# Patient Record
Sex: FEMALE | Race: WHITE | NOT HISPANIC OR LATINO | URBAN - METROPOLITAN AREA
[De-identification: names, ages, dates, MRNs, and addresses within clinical notes are randomized per-mention and may not be internally consistent; named-entity substitution may affect disease eponyms.]

---

## 2017-01-20 ENCOUNTER — OUTPATIENT (OUTPATIENT)
Dept: OUTPATIENT SERVICES | Facility: HOSPITAL | Age: 67
LOS: 1 days | End: 2017-01-20
Payer: COMMERCIAL

## 2017-01-20 VITALS
TEMPERATURE: 98 F | DIASTOLIC BLOOD PRESSURE: 66 MMHG | HEART RATE: 63 BPM | WEIGHT: 150.36 LBS | OXYGEN SATURATION: 97 % | SYSTOLIC BLOOD PRESSURE: 107 MMHG | HEIGHT: 64.5 IN | RESPIRATION RATE: 13 BRPM

## 2017-01-20 VITALS
HEART RATE: 65 BPM | OXYGEN SATURATION: 100 % | SYSTOLIC BLOOD PRESSURE: 122 MMHG | RESPIRATION RATE: 16 BRPM | DIASTOLIC BLOOD PRESSURE: 80 MMHG

## 2017-01-20 DIAGNOSIS — Z98.42 CATARACT EXTRACTION STATUS, LEFT EYE: Chronic | ICD-10-CM

## 2017-01-20 DIAGNOSIS — H25.11 AGE-RELATED NUCLEAR CATARACT, RIGHT EYE: ICD-10-CM

## 2017-01-20 PROCEDURE — 66984 XCAPSL CTRC RMVL W/O ECP: CPT | Mod: RT

## 2017-01-20 PROCEDURE — V2787: CPT

## 2017-01-20 NOTE — ASU DISCHARGE PLAN (ADULT/PEDIATRIC). - NOTIFY
Bleeding that does not stop/Pain not relieved by Medications/Persistent Nausea and Vomiting/Swelling that continues/Fever greater than 101

## 2017-01-20 NOTE — ASU DISCHARGE PLAN (ADULT/PEDIATRIC). - PT EDUC
Implant card (specify)/Intraocular lens implant (IOL) , Eye shield with instructions , sunglasses and eye kit given to patient.

## 2018-05-04 ENCOUNTER — EMERGENCY (EMERGENCY)
Facility: HOSPITAL | Age: 68
LOS: 1 days | End: 2018-05-04
Attending: EMERGENCY MEDICINE
Payer: MEDICARE

## 2018-05-04 VITALS
RESPIRATION RATE: 17 BRPM | OXYGEN SATURATION: 99 % | HEART RATE: 76 BPM | DIASTOLIC BLOOD PRESSURE: 84 MMHG | WEIGHT: 149.91 LBS | HEIGHT: 64 IN | SYSTOLIC BLOOD PRESSURE: 135 MMHG | TEMPERATURE: 97 F

## 2018-05-04 DIAGNOSIS — Z98.42 CATARACT EXTRACTION STATUS, LEFT EYE: Chronic | ICD-10-CM

## 2018-05-04 PROCEDURE — 70486 CT MAXILLOFACIAL W/O DYE: CPT

## 2018-05-04 PROCEDURE — 99284 EMERGENCY DEPT VISIT MOD MDM: CPT | Mod: 25

## 2018-05-04 PROCEDURE — 70486 CT MAXILLOFACIAL W/O DYE: CPT | Mod: 26

## 2018-05-04 PROCEDURE — 12011 RPR F/E/E/N/L/M 2.5 CM/<: CPT

## 2018-05-04 PROCEDURE — G0168: CPT

## 2018-05-04 RX ORDER — ACETAMINOPHEN 500 MG
975 TABLET ORAL ONCE
Qty: 0 | Refills: 0 | Status: COMPLETED | OUTPATIENT
Start: 2018-05-04 | End: 2018-05-04

## 2018-05-04 RX ADMIN — Medication 975 MILLIGRAM(S): at 22:54

## 2018-05-04 NOTE — ED PROVIDER NOTE - PHYSICAL EXAMINATION
NAD, VSS, Afebrile, + PERRL with full EOMs, + Multiple superficial abrasions to fight sided face, + Right superficial 0.5ml laceration to right lat eyebrow, + Right zygoma and nasal  with mild swelling, + Superficial abrasions to right shoulder and left knee without amaris tender, full ROMs of all extremities. Neuro intact. NAD, VSS, Afebrile, + PERRL with full EOMs, + Multiple superficial abrasions to fight sided face, + Right superficial 0.5ml laceration to right lat eyebrow, + Right zygoma and nasal  with mild swelling, + Superficial abrasions to right shoulder and left knee without amaris tender, full ROMs of all extremities. Neuro intact.       Attending note. Patient is alert and in no acute distress. Pupils are 3 mm equal reactive drape patient has bilateral intraocular lenses. Excellent ocular muscles are intact. Patient's tenderness over the right zygoma. Patient has abrasions about the right brow, right cheek, bridge of her nose. There is no mid face tenderness. TMJ is nontender. Patient has full range of motion of her neck without pain or neurologic symptoms.  Neck and spine are nontender. Chest/RIBS are nontender. Abdomen is soft and nontender. Pelvis is nontender. Patient has minimal tenderness with an abrasion in the palm of the right hand. There is no wrist hand or elbow tenderness.  Patient has full range of motion of the lower extremity without pain or tenderness. Patient has abrasion over her left patella which he says is nontender. Neurologic examination is nonfocal.

## 2018-05-04 NOTE — ED PROVIDER NOTE - CARE PLAN
Principal Discharge DX:	Facial laceration, initial encounter  Secondary Diagnosis:	Facial contusion, initial encounter

## 2018-05-04 NOTE — ED PROVIDER NOTE - MEDICAL DECISION MAKING DETAILS
Attending note-she would fall with abrasions to the face and the left knee. Tenderness to right zygoma-CT to rule out fracture

## 2018-05-04 NOTE — ED PROVIDER NOTE - OBJECTIVE STATEMENT
68yo female pt, ambulatory, no PMHx c/o facial injury and multiple abrasions s/p mechanical fall one hour prior to arrival. Pt stated she tripped on sidewalk and fell on right sided face. Denies LOC or dizziness or visual changes. Denies neck or back pain. Denies sensory changes or weakness to extremities. Denies CP/SOB/ABD pain or N/V. Denies pelvic or hip pain. Denies fever, chills or recent sickness. TD- UTD. 68yo female pt, ambulatory, no PMHx c/o facial injury and multiple abrasions s/p mechanical fall one hour prior to arrival. Pt stated she tripped on sidewalk and fell on right sided face. Denies LOC or dizziness or visual changes. Denies neck or back pain. Denies sensory changes or weakness to extremities. Denies CP/SOB/ABD pain or N/V. Denies pelvic or hip pain. Denies fever, chills or recent sickness. TD- UTD.       Attending note. Patient was seen in fast track room #5. Agree with the above. Patient tripped on uneven pavement today falling and striking her face on the ground. Patient sustained abrasions to the face and left knee. Patient complaining of pain in the right cheek. Patient denies any headache, dizziness, nausea or vomiting. Patient has no neck pain or back pain at this time. There is no chest/rib pain or abdominal pain. He has some pain in the palm of the right hand. Patient has no numbness or weakness. Patient denies any epistaxis.

## 2021-02-09 ENCOUNTER — APPOINTMENT (OUTPATIENT)
Dept: MAMMOGRAPHY | Facility: IMAGING CENTER | Age: 71
End: 2021-02-09
Payer: MEDICARE

## 2021-02-09 ENCOUNTER — OUTPATIENT (OUTPATIENT)
Dept: OUTPATIENT SERVICES | Facility: HOSPITAL | Age: 71
LOS: 1 days | End: 2021-02-09
Payer: MEDICARE

## 2021-02-09 ENCOUNTER — APPOINTMENT (OUTPATIENT)
Dept: RADIOLOGY | Facility: IMAGING CENTER | Age: 71
End: 2021-02-09
Payer: MEDICARE

## 2021-02-09 DIAGNOSIS — Z00.8 ENCOUNTER FOR OTHER GENERAL EXAMINATION: ICD-10-CM

## 2021-02-09 DIAGNOSIS — Z98.42 CATARACT EXTRACTION STATUS, LEFT EYE: Chronic | ICD-10-CM

## 2021-02-09 PROCEDURE — 77067 SCR MAMMO BI INCL CAD: CPT

## 2021-02-09 PROCEDURE — 77080 DXA BONE DENSITY AXIAL: CPT

## 2021-02-09 PROCEDURE — 77063 BREAST TOMOSYNTHESIS BI: CPT | Mod: 26

## 2021-02-09 PROCEDURE — 77080 DXA BONE DENSITY AXIAL: CPT | Mod: 26

## 2021-02-09 PROCEDURE — 77067 SCR MAMMO BI INCL CAD: CPT | Mod: 26

## 2021-02-09 PROCEDURE — 77063 BREAST TOMOSYNTHESIS BI: CPT

## 2021-02-17 ENCOUNTER — APPOINTMENT (OUTPATIENT)
Dept: MAMMOGRAPHY | Facility: IMAGING CENTER | Age: 71
End: 2021-02-17
Payer: MEDICARE

## 2021-02-17 ENCOUNTER — APPOINTMENT (OUTPATIENT)
Dept: ULTRASOUND IMAGING | Facility: IMAGING CENTER | Age: 71
End: 2021-02-17
Payer: MEDICARE

## 2021-02-17 ENCOUNTER — OUTPATIENT (OUTPATIENT)
Dept: OUTPATIENT SERVICES | Facility: HOSPITAL | Age: 71
LOS: 1 days | End: 2021-02-17
Payer: MEDICARE

## 2021-02-17 DIAGNOSIS — Z98.42 CATARACT EXTRACTION STATUS, LEFT EYE: Chronic | ICD-10-CM

## 2021-02-17 DIAGNOSIS — Z00.8 ENCOUNTER FOR OTHER GENERAL EXAMINATION: ICD-10-CM

## 2021-02-17 PROCEDURE — 76642 ULTRASOUND BREAST LIMITED: CPT | Mod: 26,RT

## 2021-02-17 PROCEDURE — 77065 DX MAMMO INCL CAD UNI: CPT

## 2021-02-17 PROCEDURE — 76642 ULTRASOUND BREAST LIMITED: CPT

## 2021-02-17 PROCEDURE — G0279: CPT

## 2021-02-17 PROCEDURE — G0279: CPT | Mod: 26

## 2021-02-17 PROCEDURE — 77065 DX MAMMO INCL CAD UNI: CPT | Mod: 26,RT

## 2021-02-23 ENCOUNTER — RESULT REVIEW (OUTPATIENT)
Age: 71
End: 2021-02-23

## 2021-02-23 ENCOUNTER — APPOINTMENT (OUTPATIENT)
Dept: MAMMOGRAPHY | Facility: IMAGING CENTER | Age: 71
End: 2021-02-23
Payer: MEDICARE

## 2021-02-23 ENCOUNTER — OUTPATIENT (OUTPATIENT)
Dept: OUTPATIENT SERVICES | Facility: HOSPITAL | Age: 71
LOS: 1 days | End: 2021-02-23
Payer: MEDICARE

## 2021-02-23 DIAGNOSIS — Z00.8 ENCOUNTER FOR OTHER GENERAL EXAMINATION: ICD-10-CM

## 2021-02-23 DIAGNOSIS — Z98.42 CATARACT EXTRACTION STATUS, LEFT EYE: Chronic | ICD-10-CM

## 2021-02-23 PROCEDURE — 88305 TISSUE EXAM BY PATHOLOGIST: CPT

## 2021-02-23 PROCEDURE — 77065 DX MAMMO INCL CAD UNI: CPT

## 2021-02-23 PROCEDURE — 88305 TISSUE EXAM BY PATHOLOGIST: CPT | Mod: 26

## 2021-02-23 PROCEDURE — 77065 DX MAMMO INCL CAD UNI: CPT | Mod: 26,RT

## 2021-02-23 PROCEDURE — 19081 BX BREAST 1ST LESION STRTCTC: CPT

## 2021-02-23 PROCEDURE — 19081 BX BREAST 1ST LESION STRTCTC: CPT | Mod: RT

## 2021-02-23 PROCEDURE — A4648: CPT

## 2022-04-04 ENCOUNTER — APPOINTMENT (OUTPATIENT)
Dept: ORTHOPEDIC SURGERY | Facility: CLINIC | Age: 72
End: 2022-04-04

## 2023-09-12 ENCOUNTER — EMERGENCY (EMERGENCY)
Facility: HOSPITAL | Age: 73
LOS: 1 days | Discharge: HOME | End: 2023-09-12
Attending: EMERGENCY MEDICINE
Payer: MEDICARE

## 2023-09-12 VITALS
HEART RATE: 73 BPM | HEIGHT: 65 IN | DIASTOLIC BLOOD PRESSURE: 81 MMHG | WEIGHT: 149.91 LBS | RESPIRATION RATE: 20 BRPM | SYSTOLIC BLOOD PRESSURE: 130 MMHG | OXYGEN SATURATION: 98 % | TEMPERATURE: 98 F

## 2023-09-12 DIAGNOSIS — Z98.42 CATARACT EXTRACTION STATUS, LEFT EYE: Chronic | ICD-10-CM

## 2023-09-12 PROCEDURE — 12002 RPR S/N/AX/GEN/TRNK2.6-7.5CM: CPT

## 2023-09-12 PROCEDURE — 99284 EMERGENCY DEPT VISIT MOD MDM: CPT | Mod: 25,GC

## 2023-09-13 VITALS
TEMPERATURE: 98 F | HEART RATE: 58 BPM | OXYGEN SATURATION: 97 % | RESPIRATION RATE: 19 BRPM | SYSTOLIC BLOOD PRESSURE: 150 MMHG | DIASTOLIC BLOOD PRESSURE: 87 MMHG

## 2023-09-13 PROCEDURE — 90715 TDAP VACCINE 7 YRS/> IM: CPT

## 2023-09-13 PROCEDURE — 90471 IMMUNIZATION ADMIN: CPT

## 2023-09-13 PROCEDURE — 99283 EMERGENCY DEPT VISIT LOW MDM: CPT | Mod: 25

## 2023-09-13 PROCEDURE — 12002 RPR S/N/AX/GEN/TRNK2.6-7.5CM: CPT

## 2023-09-13 RX ORDER — IBUPROFEN 200 MG
600 TABLET ORAL ONCE
Refills: 0 | Status: COMPLETED | OUTPATIENT
Start: 2023-09-13 | End: 2023-09-13

## 2023-09-13 RX ORDER — ACETAMINOPHEN 500 MG
650 TABLET ORAL ONCE
Refills: 0 | Status: COMPLETED | OUTPATIENT
Start: 2023-09-13 | End: 2023-09-13

## 2023-09-13 RX ORDER — TETANUS TOXOID, REDUCED DIPHTHERIA TOXOID AND ACELLULAR PERTUSSIS VACCINE, ADSORBED 5; 2.5; 8; 8; 2.5 [IU]/.5ML; [IU]/.5ML; UG/.5ML; UG/.5ML; UG/.5ML
0.5 SUSPENSION INTRAMUSCULAR ONCE
Refills: 0 | Status: COMPLETED | OUTPATIENT
Start: 2023-09-13 | End: 2023-09-13

## 2023-09-13 RX ADMIN — Medication 600 MILLIGRAM(S): at 03:15

## 2023-09-13 RX ADMIN — Medication 650 MILLIGRAM(S): at 03:15

## 2023-09-13 RX ADMIN — TETANUS TOXOID, REDUCED DIPHTHERIA TOXOID AND ACELLULAR PERTUSSIS VACCINE, ADSORBED 0.5 MILLILITER(S): 5; 2.5; 8; 8; 2.5 SUSPENSION INTRAMUSCULAR at 02:21

## 2023-09-13 NOTE — ED PROVIDER NOTE - ATTENDING CONTRIBUTION TO CARE
MD Verdugo:  patient seen and evaluated personally.   I agree with the History & Physical,  Impression & Plan other than what was detailed in my note.  MD Verdugo  73 y/ of not on blood thinners, r handed was cutting watermelon just prior to arrival when she cuts thenar eminence, mild pain, took no meds, no cp, no abd pain, afebrile vitals stable  non toxic well appearing, NC/AT,  conjunctiva non conjected, sclera anicteric, moist mucous membranes, neck supple, heart sounds, normal, no mrg, lungs cta b/l no wrr, abd soft non distended w/ no tenderness, no visual deformities of extremities, axox3, normal mood and affect, pos lac to thenar eminence nv intat distally, will need sutures, tdap

## 2023-09-13 NOTE — ED PROVIDER NOTE - NSICDXPASTMEDICALHX_GEN_ALL_CORE_FT
PAST MEDICAL HISTORY:  Breast Lump bilateral biopsy 3x biopsy benign    Dislocation of Shoulder

## 2023-09-13 NOTE — ED PROVIDER NOTE - PHYSICAL EXAMINATION
GENERAL: Awake, alert, NAD  HEENT: NC/AT, moist mucous membranes, PERRL, EOMI  LUNGS: CTAB, no wheezes or crackles   CARDIAC: RRR, no m/r/g  ABDOMEN: Soft, non tender, non distended, no rebound, no guarding  BACK: No midline spinal tenderness, no CVA tenderness  EXT: No edema, no calf tenderness, 2+ DP pulses bilaterally, no deformities. laceration of left palm ~5cm  NEURO: A&Ox3. Moving all extremities.  SKIN: Warm and dry. No rash.  PSYCH: Normal affect.

## 2023-09-13 NOTE — ED PROVIDER NOTE - NSFOLLOWUPINSTRUCTIONS_ED_ALL_ED_FT
Sutures, Staples, or Adhesive Wound Closure    Please return in 7-10 days for suture removal.   You may also follow up with the hand surgeon in a week.      Return to the ER for any new or concerning symptoms.   You may take 650 mg acetaminophen every eight hours or 600 mg Motrin every 6 hours as needed for pain.   Drink plenty of fluids and rest.      Doctors use stitches (sutures), staples, skin glue (tissue adhesive), and skin tape (adhesive strips) to hold your skin together while it heals (wound closure). What your doctor will use depends on your wound. Your doctor also may use more than one way to close your wound.    In most cases, your wound will be closed right away (primary skin closure). Sometimes, it may be closed later so that it can be cleaned and then heal on its own (delayed wound closure).    Sutures    Sutures come in many different materials, strengths, and sizes. Some sutures break down as your wound heals (absorbable). Other sutures need to be taken out (nonabsorbable).  To use sutures, your doctor will:  •Sew your skin together with sutures and a needle.  •Use one long stitch or separate stitches.  •Tie and cut the sutures at the end.    Sutures can be used for all types of wounds, including under the skin. They can cause a skin reaction that can lead to infection.      Staples are faster to use than sutures, and they cause less reaction from your skin. Staples need to be taken out using a tool that bends the staples away from your skin.  Follow these instructions at home:    Medicines   •Take over-the-counter and prescription medicines only as told by your doctor.  •If you were prescribed an antibiotic medicine, take it as told by your doctor. Do not stop taking it even if you start to feel better.    Wound care   Two stitched wounds. One is normal. The other is red with pus and infected.   •Follow instructions from your doctor about how to take care of your wound and bandage.  •Wash your hands with soap and water for at least 20 seconds before and after touching your wound or bandage. If you cannot use soap and water, use hand   • Do not try to take off or take out your wound closures unless your doctor tells you to do that. You may need a follow-up visit for your doctor to take out your closures.  •Closures may stay in place for 2 weeks or longer.  •Absorbable sutures may break down after a few days or weeks.  •If skin tape edges start to loosen and curl up, you may trim the loose edges.  • Do not pick at your wound. Picking can cause an infection or cause your wound to open up again.  •Apply ointments or creams only as told by your doctor.  •Check your wound every day for signs of infection. Check for:  •Redness, swelling, or pain.  •Fluid or blood.  •New warmth, a rash, or hardness at the wound site.  •Pus or a bad smell.    General instructions   • Do not take baths, swim, or use a hot tub. Ask your doctor about taking showers or sponge baths.  • Do not soak your wound in water.  •Eat foods that include protein, vitamin A, and vitamin C. These nutrients help your wound heal.  •Drink enough fluid to keep your pee (urine) pale yellow.  •Keep all follow-up visits.    Contact a doctor if:  •You have a fever or chills.  •You have redness, swelling, or pain around your wound.  •You have fluid or blood coming from your wound.  •You have new warmth, a rash, or hardness around your wound.  •You see that your wound becomes thick, raised, and darker in color after your sutures come out (scarring).    Get help right away if:  •The edges of your wound start to separate.  •Your wound opens up again.  •You notice pus or a bad smell coming from your wound.    Summary  •What your doctor uses to hold your skin together while it heals (wound closure) depends on your wound  •Your doctor may use stitches (sutures), staples, skin glue (tissue adhesive), or skin tape (adhesive strips).  • Do not try to take off or take out your wound closures unless your doctor tells you to do that.  • Do not soak your wound in water.

## 2023-09-13 NOTE — ED PROVIDER NOTE - CARE PROVIDER_API CALL
Jaime Brennan  Plastic Surgery  [address], [address]  [place] [address]  Phone: [phone]  Fax: [phone]  Follow Up Time: 7-10 Days

## 2023-09-13 NOTE — ED PROVIDER NOTE - OBJECTIVE STATEMENT
73-year-old female no significant medical history presenting with a chief complaint-year-old female no significant medical history presenting after laceration to the left hand.  Patient states she was cutting watermelon with a new knife when she went up cutting her palm.  Patient reports that after she came to the hospital did not take any pain medication.  Patient does not know when her last tetanus shot was.  Patient does not have any difficulty moving her fingers or changes in sensation but states that she has pain over the palmar aspect of her left hand radiating up.  Patient denies fevers, chills, chest pain, shortness of breath, headache, visual changes, nausea or vomiting.

## 2023-09-13 NOTE — ED ADULT NURSE NOTE - OBJECTIVE STATEMENT
72 y/o axox4 F arrived from home status post cutting L palm with knife while cutting watermelon. Pt endorses pain in L palm. Pt ambulates independently.

## 2023-09-13 NOTE — ED PROVIDER NOTE - CARE PROVIDERS DIRECT ADDRESSES
[email or web address]

## 2023-09-13 NOTE — ED PROVIDER NOTE - CLINICAL SUMMARY MEDICAL DECISION MAKING FREE TEXT BOX
73-year-old female no medical history that sustained a left palm laceration.  The patient will have laceration repair.  We will also give patient tetanus shot.  Will discharge patient after.  Patient will not require antibiotics at this time.  Patient agreeable to the plan.

## 2023-09-13 NOTE — ED PROVIDER NOTE - PATIENT PORTAL LINK FT
You can access the FollowMyHealth Patient Portal offered by [facility] by registering at the following website: [email or web address]. By joining [employer]’s FollowMyHealth portal, you will also be able to view your health information using other applications (apps) compatible with our system.

## 2024-06-26 ENCOUNTER — EMERGENCY (EMERGENCY)
Facility: HOSPITAL | Age: 74
LOS: 1 days | Discharge: HOME | End: 2024-06-26
Attending: EMERGENCY MEDICINE
Payer: MEDICARE

## 2024-06-26 VITALS
OXYGEN SATURATION: 100 % | WEIGHT: 151.9 LBS | SYSTOLIC BLOOD PRESSURE: 142 MMHG | HEART RATE: 77 BPM | DIASTOLIC BLOOD PRESSURE: 82 MMHG | TEMPERATURE: 98 F | RESPIRATION RATE: 18 BRPM | HEIGHT: 63 IN

## 2024-06-26 VITALS
SYSTOLIC BLOOD PRESSURE: 138 MMHG | DIASTOLIC BLOOD PRESSURE: 75 MMHG | OXYGEN SATURATION: 98 % | HEART RATE: 62 BPM | RESPIRATION RATE: 19 BRPM | TEMPERATURE: 98 F

## 2024-06-26 DIAGNOSIS — Z98.42 CATARACT EXTRACTION STATUS, LEFT EYE: Chronic | ICD-10-CM

## 2024-06-26 DIAGNOSIS — J38.5 LARYNGEAL SPASM: ICD-10-CM

## 2024-06-26 LAB
ALBUMIN SERPL ELPH-MCNC: 4.4 G/DL — SIGNIFICANT CHANGE UP (ref 3.3–5)
ALP SERPL-CCNC: 92 U/L — SIGNIFICANT CHANGE UP (ref 40–120)
ALT FLD-CCNC: 32 U/L — SIGNIFICANT CHANGE UP (ref 10–45)
ANION GAP SERPL CALC-SCNC: 15 MMOL/L — SIGNIFICANT CHANGE UP (ref 5–17)
AST SERPL-CCNC: 32 U/L — SIGNIFICANT CHANGE UP (ref 10–40)
BASE EXCESS BLDV CALC-SCNC: 0.7 MMOL/L — SIGNIFICANT CHANGE UP (ref -2–3)
BASOPHILS # BLD AUTO: 0.03 K/UL — SIGNIFICANT CHANGE UP (ref 0–0.2)
BASOPHILS NFR BLD AUTO: 0.5 % — SIGNIFICANT CHANGE UP (ref 0–2)
BILIRUB SERPL-MCNC: 0.2 MG/DL — SIGNIFICANT CHANGE UP (ref 0.2–1.2)
BUN SERPL-MCNC: 19 MG/DL — SIGNIFICANT CHANGE UP (ref 7–23)
CA-I SERPL-SCNC: 1.27 MMOL/L — SIGNIFICANT CHANGE UP (ref 1.15–1.33)
CALCIUM SERPL-MCNC: 10 MG/DL — SIGNIFICANT CHANGE UP (ref 8.4–10.5)
CHLORIDE BLDV-SCNC: 106 MMOL/L — SIGNIFICANT CHANGE UP (ref 96–108)
CHLORIDE SERPL-SCNC: 105 MMOL/L — SIGNIFICANT CHANGE UP (ref 96–108)
CO2 BLDV-SCNC: 27 MMOL/L — HIGH (ref 22–26)
CO2 SERPL-SCNC: 22 MMOL/L — SIGNIFICANT CHANGE UP (ref 22–31)
CREAT SERPL-MCNC: 0.76 MG/DL — SIGNIFICANT CHANGE UP (ref 0.5–1.3)
EGFR: 84 ML/MIN/1.73M2 — SIGNIFICANT CHANGE UP
EOSINOPHIL # BLD AUTO: 0.12 K/UL — SIGNIFICANT CHANGE UP (ref 0–0.5)
EOSINOPHIL NFR BLD AUTO: 1.9 % — SIGNIFICANT CHANGE UP (ref 0–6)
GAS PNL BLDV: 136 MMOL/L — SIGNIFICANT CHANGE UP (ref 136–145)
GAS PNL BLDV: SIGNIFICANT CHANGE UP
GLUCOSE BLDV-MCNC: 108 MG/DL — HIGH (ref 70–99)
GLUCOSE SERPL-MCNC: 111 MG/DL — HIGH (ref 70–99)
HCO3 BLDV-SCNC: 26 MMOL/L — SIGNIFICANT CHANGE UP (ref 22–29)
HCT VFR BLD CALC: 44.8 % — SIGNIFICANT CHANGE UP (ref 34.5–45)
HCT VFR BLDA CALC: 47 % — HIGH (ref 34.5–46.5)
HGB BLD CALC-MCNC: 15.6 G/DL — SIGNIFICANT CHANGE UP (ref 11.7–16.1)
HGB BLD-MCNC: 15.1 G/DL — SIGNIFICANT CHANGE UP (ref 11.5–15.5)
IMM GRANULOCYTES NFR BLD AUTO: 0.2 % — SIGNIFICANT CHANGE UP (ref 0–0.9)
LACTATE BLDV-MCNC: 1.9 MMOL/L — SIGNIFICANT CHANGE UP (ref 0.5–2)
LYMPHOCYTES # BLD AUTO: 1.71 K/UL — SIGNIFICANT CHANGE UP (ref 1–3.3)
LYMPHOCYTES # BLD AUTO: 27.1 % — SIGNIFICANT CHANGE UP (ref 13–44)
MCHC RBC-ENTMCNC: 31.7 PG — SIGNIFICANT CHANGE UP (ref 27–34)
MCHC RBC-ENTMCNC: 33.7 GM/DL — SIGNIFICANT CHANGE UP (ref 32–36)
MCV RBC AUTO: 94.1 FL — SIGNIFICANT CHANGE UP (ref 80–100)
MONOCYTES # BLD AUTO: 0.32 K/UL — SIGNIFICANT CHANGE UP (ref 0–0.9)
MONOCYTES NFR BLD AUTO: 5.1 % — SIGNIFICANT CHANGE UP (ref 2–14)
NEUTROPHILS # BLD AUTO: 4.11 K/UL — SIGNIFICANT CHANGE UP (ref 1.8–7.4)
NEUTROPHILS NFR BLD AUTO: 65.2 % — SIGNIFICANT CHANGE UP (ref 43–77)
NRBC # BLD: 0 /100 WBCS — SIGNIFICANT CHANGE UP (ref 0–0)
PCO2 BLDV: 43 MMHG — HIGH (ref 39–42)
PH BLDV: 7.39 — SIGNIFICANT CHANGE UP (ref 7.32–7.43)
PLATELET # BLD AUTO: 206 K/UL — SIGNIFICANT CHANGE UP (ref 150–400)
PO2 BLDV: 29 MMHG — SIGNIFICANT CHANGE UP (ref 25–45)
POTASSIUM BLDV-SCNC: 4.2 MMOL/L — SIGNIFICANT CHANGE UP (ref 3.5–5.1)
POTASSIUM SERPL-MCNC: 4.1 MMOL/L — SIGNIFICANT CHANGE UP (ref 3.5–5.3)
POTASSIUM SERPL-SCNC: 4.1 MMOL/L — SIGNIFICANT CHANGE UP (ref 3.5–5.3)
PROT SERPL-MCNC: 7.4 G/DL — SIGNIFICANT CHANGE UP (ref 6–8.3)
RBC # BLD: 4.76 M/UL — SIGNIFICANT CHANGE UP (ref 3.8–5.2)
RBC # FLD: 12.3 % — SIGNIFICANT CHANGE UP (ref 10.3–14.5)
SAO2 % BLDV: 44.4 % — LOW (ref 67–88)
SODIUM SERPL-SCNC: 142 MMOL/L — SIGNIFICANT CHANGE UP (ref 135–145)
WBC # BLD: 6.3 K/UL — SIGNIFICANT CHANGE UP (ref 3.8–10.5)
WBC # FLD AUTO: 6.3 K/UL — SIGNIFICANT CHANGE UP (ref 3.8–10.5)

## 2024-06-26 PROCEDURE — 82803 BLOOD GASES ANY COMBINATION: CPT

## 2024-06-26 PROCEDURE — 82330 ASSAY OF CALCIUM: CPT

## 2024-06-26 PROCEDURE — 85018 HEMOGLOBIN: CPT

## 2024-06-26 PROCEDURE — 93010 ELECTROCARDIOGRAM REPORT: CPT

## 2024-06-26 PROCEDURE — 99285 EMERGENCY DEPT VISIT HI MDM: CPT

## 2024-06-26 PROCEDURE — 70491 CT SOFT TISSUE NECK W/DYE: CPT | Mod: MC

## 2024-06-26 PROCEDURE — 84295 ASSAY OF SERUM SODIUM: CPT

## 2024-06-26 PROCEDURE — 36415 COLL VENOUS BLD VENIPUNCTURE: CPT

## 2024-06-26 PROCEDURE — 82947 ASSAY GLUCOSE BLOOD QUANT: CPT

## 2024-06-26 PROCEDURE — 93005 ELECTROCARDIOGRAM TRACING: CPT

## 2024-06-26 PROCEDURE — 70491 CT SOFT TISSUE NECK W/DYE: CPT | Mod: 26,MC

## 2024-06-26 PROCEDURE — 82435 ASSAY OF BLOOD CHLORIDE: CPT

## 2024-06-26 PROCEDURE — 99283 EMERGENCY DEPT VISIT LOW MDM: CPT

## 2024-06-26 PROCEDURE — 99285 EMERGENCY DEPT VISIT HI MDM: CPT | Mod: 25

## 2024-06-26 PROCEDURE — 80053 COMPREHEN METABOLIC PANEL: CPT

## 2024-06-26 PROCEDURE — 84132 ASSAY OF SERUM POTASSIUM: CPT

## 2024-06-26 PROCEDURE — 85025 COMPLETE CBC W/AUTO DIFF WBC: CPT

## 2024-06-26 PROCEDURE — 85014 HEMATOCRIT: CPT

## 2024-06-26 PROCEDURE — 83605 ASSAY OF LACTIC ACID: CPT

## 2024-06-26 RX ORDER — SODIUM CHLORIDE 9 MG/ML
500 INJECTION, SOLUTION INTRAVENOUS
Refills: 0 | Status: ACTIVE | OUTPATIENT
Start: 2024-06-26 | End: 2024-06-26

## 2024-06-26 NOTE — CONSULT NOTE ADULT - SUBJECTIVE AND OBJECTIVE BOX
CC: airway evaluation    HPI: 74yo female with no significant PMHx, presents to the ED c/o throat tightness last night. Pt states last night she began feeling like her throat was tightening and had thickened mucus, which has since resolved. She states 2 weeks ago she had an episode of stridor and difficulty breathing. She woke up with sensation of throat closure, making it difficult to breathe, which self-resolved after 1 minute. She c/o heartburn prior to sleeping that night. She saw GI, who diagnosed her with reflux and likely laryngospasm and recommended an OTC acid reducer to take as needed. Pt states last night she felt like a similar episode was starting, however she did not have the same sensation of complete throat closure and stridor. 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 Pt needed voice therapy but never went. She currently has an appointment with Dr. Pfeiffer, Laryngologist, on July 1. Pt denies fever, chills, n/v, HA, SOB, dysphagia, odynophagia, hemoptysis, unintentional weight loss.       PAST MEDICAL & SURGICAL HISTORY:  Dislocation of Shoulder      Breast Lump bilateral biopsy  3x biopsy benign      S/P left cataract extraction        Allergies    penicillin (Rash)    Intolerances      MEDICATIONS  (STANDING):  lactated ringers. 500 milliLiter(s) (70 mL/Hr) IV Continuous <Continuous>    MEDICATIONS  (PRN):      Social History: Pt denies tobacco use     Family history: Pt denies any significant family history     ROS:   ENT: all negative except as noted in HPI   CV: denies palpitations  Pulm: denies SOB, cough, hemoptysis  GI: denies change in apetite, indigestion, n/v  : denies pertinent urinary symptoms, urgency  Neuro: denies numbness/tingling, loss of sensation  Psych: denies anxiety  MS: denies muscle weakness, instability  Heme: denies easy bruising or bleeding  Endo: denies heat/cold intolerance, excessive sweating  Vascular: denies LE edema    Vital Signs Last 24 Hrs  T(C): 36.6 (26 Jun 2024 05:23), Max: 36.6 (26 Jun 2024 05:23)  T(F): 97.8 (26 Jun 2024 05:23), Max: 97.8 (26 Jun 2024 05:23)  HR: 77 (26 Jun 2024 05:23) (77 - 77)  BP: 142/82 (26 Jun 2024 05:23) (142/82 - 142/82)  BP(mean): --  RR: 18 (26 Jun 2024 05:23) (18 - 18)  SpO2: 100% (26 Jun 2024 05:23) (100% - 100%)    Parameters below as of 26 Jun 2024 05:23  Patient On (Oxygen Delivery Method): room air                              15.1   6.30  )-----------( 206      ( 26 Jun 2024 07:25 )             44.8    06-26    142  |  105  |  19  ----------------------------<  111<H>  4.1   |  22  |  0.76    Ca    10.0      26 Jun 2024 07:25    TPro  7.4  /  Alb  4.4  /  TBili  0.2  /  DBili  x   /  AST  32  /  ALT  32  /  AlkPhos  92  06-26       PHYSICAL EXAM:  Gen: NAD, mild hoarseness (baseline per pt)  Skin: No rashes, bruises, or lesions  Head: Normocephalic, Atraumatic  Face: no edema, erythema, or fluctuance. Parotid glands soft without mass  Eyes: no scleral injection  Nose: Nares bilaterally patent, no discharge  Mouth: No Stridor / Drooling / Trismus.  Mucosa moist, tongue/uvula midline, oropharynx clear  Neck: Flat, supple, no lymphadenopathy, trachea midline, no masses  Lymphatic: No lymphadenopathy  Resp: breathing easily, no stridor  CV: no peripheral edema/cyanosis  GI: nondistended   Peripheral vascular: no JVD or edema  Neuro: facial nerve intact, no facial droop      Fiberoptic Indirect laryngoscopy:  (Scope #2 used)  Deferred

## 2024-06-26 NOTE — ED PROVIDER NOTE - PATIENT PORTAL LINK FT
You can access the FollowMyHealth Patient Portal offered by [facility] by registering at the following website: [email or web address]. By joining [employer]’s FollowMyHealth portal, you will also be able to view your health information using other applications (apps) compatible with our system.

## 2024-06-26 NOTE — CONSULT NOTE ADULT - PROBLEM SELECTOR RECOMMENDATION 9
- recommend omeprazole qhs  - reflux precautions (avoid spicy foods and caffeine, sit upright for 3 hours after eating, etc)  - f/u with Dr. Pfeiffer (appointment on Monday, pt will try to get an earlier appointment)

## 2024-06-26 NOTE — ED PROVIDER NOTE - CLINICAL SUMMARY MEDICAL DECISION MAKING FREE TEXT BOX
73-year-old female patient past medical history comes to the emergency department for episodes of throat tightness and shortness of breath that has been persistent for the past couple weeks.  Patient endorses last episode was 2 weeks ago while she was sleeping.  Patient currently endorsing she felt throat dryness tonight and was scared to go back to sleep given prior episode of spasm.  Patient plans to see ENT however has not been able to do so yet.  On exam, found with clear posterior pharynx, clear breath sounds and no signs of stridor.  Patient denies any history of smoking or drinking alcohol.  Plan to consult ENT for scoping and will do imaging of the soft tissue of the neck to rule out mass. 73-year-old female patient past medical history comes to the emergency department for episodes of throat tightness and shortness of breath that has been persistent for the past couple weeks.  Patient endorses last episode was 2 weeks ago while she was sleeping.  Patient currently endorsing she felt throat dryness tonight and was scared to go back to sleep given prior episode of spasm.  Patient plans to see ENT however has not been able to do so yet.  On exam, found with clear posterior pharynx, clear breath sounds and no signs of stridor.  Patient denies any history of smoking or drinking alcohol.  ENT consult, no inpt scope necessary pt will follow with their ENT On 7/1 oupt, pt also had CT neck done which was negative for a mass. Pt will discharged with recommendation to take Omeprazole 20 QD, she will buy otc. 73-year-old female patient past medical history comes to the emergency department for episodes of throat tightness and shortness of breath that has been persistent for the past couple weeks.  Patient endorses last episode was 2 weeks ago while she was sleeping.  Patient currently endorsing she felt throat dryness tonight and was scared to go back to sleep given prior episode of spasm.  Patient plans to see ENT however has not been able to do so yet.  On exam, found with clear posterior pharynx, clear breath sounds and no signs of stridor.  Patient denies any history of smoking or drinking alcohol.  ENT consult, no inpt scope necessary pt will follow with their ENT On 7/1 oupt, pt also had CT neck done which was negative for a mass. Pt will discharged with recommendation to take Omeprazole 20 QD, she will buy otc.  RGUJRAL 72yo f BIB by  for difficulty breathing and swallowing. Pt states she has had 3 episodes over the last few weeks when she is sleeping when she wakes up feeling that she is "choking" and cannot breathe or swallow. Pt went to her GI who thought she should follow up with ENT. Pt denies any recent illness, change in meds, allergies. Non smoker, no alcohol. Pt states she has always had a hoarse voice.  gave his albuterol to patient, EMS arrived noted normal vitals. On exam, Patient is awake,alert,oriented x 3. Oropharynx no swelling, mild erythema uvula midline.  Patient is well appearing and in no acute distress. Patient's chest is clear to ausculation, +s1s2. Abdomen is soft nd/nt +BS. Extremity with no swelling or calf tenderness. DDx Reflux, eval for mass, laryngospasm. Check labs, CT and consult ENT. Pt's airway patent speaking full sentences and able to tolerate PO.

## 2024-06-26 NOTE — ED ADULT NURSE REASSESSMENT NOTE - NS ED NURSE REASSESS COMMENT FT1
Report received from ZOE Kohler. Pt denies difficulty swallowing at this time, chest pain, chest palpitations, SOB. VS as documented. Plan of care for CT scan discussed with pt and monitoring. Bed locked and lowered for safety. Safety and comfort maintained.

## 2024-06-26 NOTE — CONSULT NOTE ADULT - ASSESSMENT
74yo female with no significant PMHx, presents to the ED c/o throat tightness last night. Pt states last night she began feeling like her throat was tightening and had thickened mucus, which has since resolved. She states 2 weeks ago she had an episode of stridor and difficulty breathing. She woke up with sensation of throat closure, making it difficult to breathe, which self-resolved after 1 minute. She c/o heartburn prior to sleeping that night. She saw GI, who diagnosed her with reflux and likely laryngospasm and recommended an OTC acid reducer to take as needed. Pt states last night she felt like a similar episode was starting, however she did not have the same sensation of complete throat closure and stridor. 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 Pt needed voice therapy but never went. She currently has an appointment with Dr. Pfeiffer, Laryngologist, on July 1. On exam, pt with baseline hoarseness. Pt deferred scope at this time since she has an appt with Dr. Pfeiffer on Monday. Pt states she is currently asymptomatic. Per history and exam, pt likely had episode of laryngospasm, may be 2/2 reflux.

## 2024-06-26 NOTE — ED ADULT TRIAGE NOTE - CHIEF COMPLAINT QUOTE
pt states starting @ 12am, "mucous in throat started to feel thick and coating the throat", pt states happened x2 weeks ago and again x2mo ago  pt currently has plan to see ENT  pt able to speak full sentences

## 2024-06-26 NOTE — ED PROVIDER NOTE - PHYSICAL EXAMINATION
GENERAL: Awake, alert, NAD  HEENT: NC/AT, moist mucous membranes, posterior pharynx,   LUNGS: CTAB, no wheezes or crackles   CARDIAC: RRR, no m/r/g  ABDOMEN: Soft, non distended, no rebound, no guarding  EXT: No edema, no calf tenderness, no deformities.  NEURO: A&Ox3. Moving all extremities.  SKIN: Warm and dry. No rash.  PSYCH: Normal affect.

## 2024-06-26 NOTE — ED PROVIDER NOTE - NSFOLLOWUPINSTRUCTIONS_ED_ALL_ED_FT
You came in because you kept having spasms in your throat. ENT came and saw you and recommended that you get scoped with them outside the hospital. You also had a CT neck done which was negative for any masses. We recommend you take 20 of omeprazole daily.

## 2024-06-26 NOTE — ED ADULT NURSE NOTE - OBJECTIVE STATEMENT
72 yo FM AOx4 from home with PMH of Breast lump bilateral biopsy and shoulder dislocation c/o dysphagia. Pt states at 0000 pt endorses "mucous in throat started to feel thick and coating the throat". Pt states onset of these symptoms started 2 months ago, resolved, again 2weeks ago then again resolved. Pt has plan to see ENT but came to [facility] ED for symptoms. Pt airway intact.

## 2024-07-01 ENCOUNTER — APPOINTMENT (OUTPATIENT)
Dept: OTOLARYNGOLOGY | Facility: CLINIC | Age: 74
End: 2024-07-01
Payer: MEDICARE

## 2024-07-01 VITALS
OXYGEN SATURATION: 98 % | HEART RATE: 61 BPM | WEIGHT: 155 LBS | BODY MASS INDEX: 27.46 KG/M2 | DIASTOLIC BLOOD PRESSURE: 75 MMHG | HEIGHT: 63 IN | SYSTOLIC BLOOD PRESSURE: 115 MMHG

## 2024-07-01 DIAGNOSIS — R49.0 DYSPHONIA: ICD-10-CM

## 2024-07-01 DIAGNOSIS — K21.9 GASTRO-ESOPHAGEAL REFLUX DISEASE W/OUT ESOPHAGITIS: ICD-10-CM

## 2024-07-01 DIAGNOSIS — J38.3 OTHER DISEASES OF VOCAL CORDS: ICD-10-CM

## 2024-07-01 PROCEDURE — 31579 LARYNGOSCOPY TELESCOPIC: CPT

## 2024-07-01 PROCEDURE — 99204 OFFICE O/P NEW MOD 45 MIN: CPT | Mod: 25

## 2024-07-01 RX ORDER — FAMOTIDINE 40 MG/1
40 TABLET, FILM COATED ORAL AT BEDTIME
Qty: 30 | Refills: 5 | Status: ACTIVE | COMMUNITY
Start: 2024-07-01 | End: 1900-01-01

## 2024-07-11 ENCOUNTER — APPOINTMENT (OUTPATIENT)
Dept: OTOLARYNGOLOGY | Facility: CLINIC | Age: 74
End: 2024-07-11

## 2024-07-16 ENCOUNTER — APPOINTMENT (OUTPATIENT)
Dept: OTOLARYNGOLOGY | Facility: CLINIC | Age: 74
End: 2024-07-16
Payer: MEDICARE

## 2024-07-16 PROCEDURE — 92520 LARYNGEAL FUNCTION STUDIES: CPT | Mod: GN

## 2024-07-16 PROCEDURE — 92524 BEHAVRAL QUALIT ANALYS VOICE: CPT | Mod: GN

## 2024-07-24 ENCOUNTER — APPOINTMENT (OUTPATIENT)
Dept: OTOLARYNGOLOGY | Facility: CLINIC | Age: 74
End: 2024-07-24
Payer: MEDICARE

## 2024-07-24 ENCOUNTER — OUTPATIENT (OUTPATIENT)
Dept: OUTPATIENT SERVICES | Facility: HOSPITAL | Age: 74
LOS: 1 days | Discharge: HOME | End: 2024-07-24

## 2024-07-24 DIAGNOSIS — Z98.42 CATARACT EXTRACTION STATUS, LEFT EYE: Chronic | ICD-10-CM

## 2024-07-24 PROCEDURE — 92507 TX SP LANG VOICE COMM INDIV: CPT | Mod: GN

## 2024-07-29 DIAGNOSIS — R49.0 DYSPHONIA: ICD-10-CM

## 2024-07-31 ENCOUNTER — APPOINTMENT (OUTPATIENT)
Dept: OTOLARYNGOLOGY | Facility: CLINIC | Age: 74
End: 2024-07-31

## 2024-08-14 ENCOUNTER — APPOINTMENT (OUTPATIENT)
Dept: OTOLARYNGOLOGY | Facility: CLINIC | Age: 74
End: 2024-08-14
Payer: MEDICARE

## 2024-08-14 PROCEDURE — 92507 TX SP LANG VOICE COMM INDIV: CPT | Mod: GN

## 2024-08-20 ENCOUNTER — APPOINTMENT (OUTPATIENT)
Dept: OTOLARYNGOLOGY | Facility: CLINIC | Age: 74
End: 2024-08-20

## 2024-09-03 ENCOUNTER — APPOINTMENT (OUTPATIENT)
Dept: OTOLARYNGOLOGY | Facility: CLINIC | Age: 74
End: 2024-09-03

## 2025-05-19 NOTE — ASU DISCHARGE PLAN (ADULT/PEDIATRIC). - YOU WERE IN THE HOSPITAL FOR:
Start time: 10:04am  End time: 11:00am    The patient was informed of the current need to conduct treatment via telephone or telehealth due to Covid-19 pandemic. Patient consented to the use of the platform that may not be HIPAA compliant. I have confirmed the patient's identity via the following (minimum of three) acceptable identifiers as per  Policy PH-9:   1. Last 4 of social:   2. :   3. Address:    Telephone/Televideo Informed Consent for Psychotherapy was reviewed with the patient as follows:  There are potential benefits and risks of the use of telephone or video-conferencing that differ from in-person sessions. Specifically, the telephone or televideo system we are using may not be HIPAA compliant and may present limits to patient confidentiality. Confidentiality still applies for telepsychology services, and nobody will record the session without your permission. You agree to use the telephone or video-conferencing platform selected for our virtual sessions, and I will explain how to use it.  1)             You need to use a webcam or smartphone during the session.  2)             It is important that you be in a quiet, private space that is free of distractions (including cell phone or other devices) during the session.  3)             It is important to use a secure internet connection rather than public/free Wi-Fi.  4)             It is important to be on time. If you need to cancel or change your tele-appointment, you must notify the psychologist in advance by phone or email.  5)             We need a back-up plan (e.g., phone number where you can be reached) to restart the session or to reschedule it, in the event of technical problems.  6)             We need a safety plan that includes at least one emergency contact and the closest emergency room to your location, in the event of a crisis situation.  7)             If you are not an adult, we need the permission of your parent or legal guardian  (and their contact information) for you to participate in telepsychology sessions.  Understanding and verbal agreement was attested to by the patient.    Magaly reported that she would like her notes to remain blocked at this time.     Reason for care: Anxiety; Depression  Method of therapy: Supportive; CBT  Therapy summary: Magaly reported that she started working with a . She stated that they have met twice and she feels it has been helpful. She noted that she is bringing more awareness to her eating behavior and activity levels.     She reported that she walked a 5k over the weekend and raised money for suicide prevention. She stated that it felt good to do this.     She processed attending  services for her mother's cousin and discussed reminders of her father. She continued to process grief and loss of those close to her as well as upcoming and recent death anniversaries.     She explored personal and career goals and working toward re-establishing what these goals look like. We also discussed setting goals with her partner. She processed challenges in recent interactions with him and how to use effective communication skills.     She discussed mother's health. She reported that her mother has been struggling with memory difficulties and she recently had a talk with her mother about this. She stated that her mother's health does create some anxiety due to her experiences with her father but that she is working on not fortune telling and focusing on the information she has in the present.     Action plan: walking 3x per week; meal tracking; using support from family and friends; continue identifying affirmations for self-esteem enhancing; follow-up with PCP related to physical health concerns; continue working with      She denies SI/HI/AVH. We will follow-up in two weeks.    Identified goals/objectives: Processing grief; engaging in more structured relaxation; Continue engaging  in self-care activities; identify workable schedule and routine (continue increasing activity); Continue exercising; reframe negative thinking with writing  Response to therapy: Engaged  Treatment plan and process: Continue with above stated tx goals; Psycho-education on anxiety management strategies; Goal setting for health and wellness; Schedule and Routine exploration; Processing grief; continued education on grief and loss   right eye cataract surgery